# Patient Record
Sex: MALE | Race: WHITE | NOT HISPANIC OR LATINO | Employment: UNEMPLOYED | URBAN - METROPOLITAN AREA
[De-identification: names, ages, dates, MRNs, and addresses within clinical notes are randomized per-mention and may not be internally consistent; named-entity substitution may affect disease eponyms.]

---

## 2021-04-01 ENCOUNTER — HOSPITAL ENCOUNTER (EMERGENCY)
Facility: CLINIC | Age: 36
Discharge: HOME | End: 2021-04-02
Attending: EMERGENCY MEDICINE | Admitting: EMERGENCY MEDICINE
Payer: COMMERCIAL

## 2021-04-01 DIAGNOSIS — F10.920 ALCOHOLIC INTOXICATION WITHOUT COMPLICATION (H): ICD-10-CM

## 2021-04-01 LAB — ALCOHOL BREATH TEST: 0.22 (ref 0–0.01)

## 2021-04-01 PROCEDURE — 82075 ASSAY OF BREATH ETHANOL: CPT | Performed by: EMERGENCY MEDICINE

## 2021-04-01 PROCEDURE — 99283 EMERGENCY DEPT VISIT LOW MDM: CPT | Performed by: EMERGENCY MEDICINE

## 2021-04-01 RX ORDER — ONDANSETRON 4 MG/1
4 TABLET, ORALLY DISINTEGRATING ORAL EVERY 6 HOURS PRN
Status: DISCONTINUED | OUTPATIENT
Start: 2021-04-01 | End: 2021-04-02 | Stop reason: HOSPADM

## 2021-04-01 RX ORDER — PANTOPRAZOLE SODIUM 40 MG/1
40 TABLET, DELAYED RELEASE ORAL
Status: DISCONTINUED | OUTPATIENT
Start: 2021-04-02 | End: 2021-04-02 | Stop reason: HOSPADM

## 2021-04-01 ASSESSMENT — ENCOUNTER SYMPTOMS
VOMITING: 1
ANAL BLEEDING: 0
ABDOMINAL PAIN: 0
BLOOD IN STOOL: 0

## 2021-04-02 VITALS
BODY MASS INDEX: 27.98 KG/M2 | RESPIRATION RATE: 16 BRPM | OXYGEN SATURATION: 97 % | HEART RATE: 97 BPM | WEIGHT: 195 LBS | SYSTOLIC BLOOD PRESSURE: 126 MMHG | TEMPERATURE: 98.1 F | DIASTOLIC BLOOD PRESSURE: 90 MMHG

## 2021-04-02 PROCEDURE — 250N000011 HC RX IP 250 OP 636: Performed by: EMERGENCY MEDICINE

## 2021-04-02 RX ADMIN — ONDANSETRON 4 MG: 4 TABLET, ORALLY DISINTEGRATING ORAL at 04:09

## 2021-04-02 NOTE — ED NOTES
Emergency Department Patient Sign-out       Brief HPI:  This is a 36 year old male signed out to me by Dr. Tamayo .  See initial ED Provider note for details of the presentation.            Significant Events prior to my assuming care: Patient recently broke sobriety after drinking 2 pints of whiskey for the past day.  He lives in a sober house.  No psychiatric complaints.  Awaiting sobriety, discharged when sober.      Exam:   Patient Vitals for the past 24 hrs:   BP Temp Temp src Pulse Resp SpO2 Weight   04/01/21 2340 (!) 142/90 95.4  F (35.2  C) Oral 91 18 98 % 88.5 kg (195 lb)           ED RESULTS:   Results for orders placed or performed during the hospital encounter of 04/01/21 (from the past 24 hour(s))   Alcohol breath test POCT     Status: Abnormal    Collection Time: 04/01/21 11:50 PM   Result Value Ref Range    Alcohol Breath Test 0.223 (A) 0.00 - 0.01       ED MEDICATIONS:   Medications   ondansetron (ZOFRAN-ODT) ODT tab 4 mg (has no administration in time range)   pantoprazole (PROTONIX) EC tablet 40 mg (has no administration in time range)         Impression:    ICD-10-CM    1. Alcoholic intoxication without complication (H)  F10.920        Plan:    Patient is clinically sober.  He is not exhibiting signs or symptoms of withdrawal.  The patient be discharged back to his sober house.        MD Adiel Dejesus Matthew Joseph, MD  04/02/21 6711

## 2021-04-02 NOTE — ED PROVIDER NOTES
[facility] EMERGENCY DEPARTMENT ([facility])     April 1, 2021  History     Chief Complaint   Patient presents with     Alcohol Intoxication     Sober for 2 months and then drank 2 pints today and is seeking detox. Last drink 4pm. Not suicidal or hoimicidal.      HPI  Alberto Avelar is a 36 year old male with a PMH of alcohol abuse, GERD, perforated esophagus, peptic ulcer, HTN, anxiety and MDD who presents to the ED today seeking detox. Patient states that he lost his job earlier today.  Patient states that he fell off the wagon and started drinking 2 pints and lives in a sober house.  The sober house told him he needed to come to the ER for evaluation.  Patient states he has been sober for the last 2 months and states that he has had some vomiting but has some chronic GERD type symptoms for which he takes Nexium.  The patient denies any melena or bright blood per rectum, denies any abdominal pain, denies any previous history of pancreatitis or hepatitis, and denies any suicidal ideation.  Patient states he simply fell off the wagon and he tried to tell his sober house that he would be okay but they told him to come here instead.    Patient was recently seen at [facility] urgent care on 2/11/2021 complaining of cellulitis of LUE.  He had reportedly gotten a tattoo 2 weeks prior on his left arm.  He was provided with a prescription for doxycycline and instructed to take ibuprofen for 100 mg every 6 hours as needed for pain.    I have reviewed the Medications, Allergies, Past Medical and Surgical History, and Social History in the [facility] system.    PAST MEDICAL HISTORY:   Past Medical History:   Diagnosis Date     Mild intermittent asthma 11/18/2007    Worse as a child.   Flares with infections       PAST SURGICAL HISTORY: History reviewed. No pertinent surgical history.    Past medical history, past surgical history, medications, and allergies were reviewed with the patient. Additional pertinent items:  None    FAMILY HISTORY:   Family History   Problem Relation Age of Onset     Cancer - colorectal No family hx of      Prostate Cancer No family hx of        SOCIAL HISTORY:   Social History     Tobacco Use     Smoking status: Former Smoker     Packs/day: 1.00     Types: Cigarettes     Quit date: 10/3/2012     Years since quittin.5     Smokeless tobacco: Never Used     Tobacco comment: occasional- quiting   Substance Use Topics     Alcohol use: Yes     Comment: quit date 12/ relapse 60 days ago.      Social history was reviewed with the patient. Additional pertinent items: None      Patient's Medications    BISACODYL (DULCOLAX) 5 MG EC TABLET    Take all four tabs with 8 oz of fluid as instructed by colonoscopy prep instructions    ESOMEPRAZOLE (NEXIUM) 40 MG CAPSULE    Take 1 capsule by mouth every morning (before breakfast). Take 30-60 minutes before a eating.    POLYETHYLENE GLYCOL (MIRALAX) POWDER    Mix and use as directed for colonoscopy prep    TRIAMCINOLONE (KENALOG) 0.1 % CREAM    Apply twice daily along with OTC Lotrimin cream to affected area for 14 days.          Allergies   Allergen Reactions     Amoxicillin      rash     Mold      Probable mold allergy        Review of Systems   Constitutional:        (ETOH intoxication seeking detox)     Gastrointestinal: Positive for vomiting. Negative for abdominal pain, anal bleeding and blood in stool.   Psychiatric/Behavioral: Negative for suicidal ideas.   All other systems reviewed and are negative.    A complete review of systems was performed with pertinent positives and negatives noted in the HPI, and all other systems negative.    Physical Exam   BP: (!) 142/90  Pulse: 91  Temp: 95.4  F (35.2  C)  Resp: 18  Weight: 88.5 kg (195 lb)  SpO2: 98 %      Physical Exam  Vitals signs and nursing note reviewed.   Constitutional:       Comments: Grossly intoxicated  Cooperative   HENT:      Head: Atraumatic.   Eyes:      Extraocular Movements: Extraocular  movements intact.      Pupils: Pupils are equal, round, and reactive to light.   Neck:      Musculoskeletal: Neck supple.   Pulmonary:      Breath sounds: Normal breath sounds.   Abdominal:      Palpations: Abdomen is soft.      Tenderness: There is no abdominal tenderness.   Musculoskeletal:         General: No deformity.   Neurological:      General: No focal deficit present.      Mental Status: He is alert and oriented to person, place, and time.   Psychiatric:      Comments: Calm but intoxicated         ED Course   11:50 PM  The patient was seen and examined by Cornel Tamayo MD in Room ED07.        Procedures            Results for orders placed or performed during the hospital encounter of 04/01/21 (from the past 24 hour(s))   Alcohol breath test POCT   Result Value Ref Range    Alcohol Breath Test 0.223 (A) 0.00 - 0.01     Medications   ondansetron (ZOFRAN-ODT) ODT tab 4 mg (has no administration in time range)   esomeprazole (nexIUM) CR capsule 40 mg (has no administration in time range)             Assessments & Plan (with Medical Decision Making)     I have reviewed the nursing notes.    At this time there are no detox beds available in our hospital and this was communicated to the patient.  Patient more than likely will not need detox because he has only started drinking for 24 hours.  Patient will be kept in the ER to sober up and then will more than likely be sent back to his sober house.    Case will be signed out to my partners to follow-up with the final disposition for this patient.    I have reviewed the findings, diagnosis, and plan with the patient.      Working diagnoses:   Alcoholic intoxication without complication (H)     Cornel Tamayo MD, Chente DESAI, am serving as a trained medical scribe to document services personally performed by Cornel Tamayo Md, MD, based on the provider's statements to me.     I, Cornel Tamayo Md, MD, was physically present  and have reviewed and verified the accuracy of this note documented by Chente Santacruz.      4/1/2021   [facility] EMERGENCY DEPARTMENT     Cornel Tamayo MD  04/02/21 0007

## 2021-04-02 NOTE — ED TRIAGE NOTES
Sober for 2 months and then drank 2 pints today and is seeking detox. Last drink 4pm. Not suicidal or hoimicidal.

## 2022-12-27 ENCOUNTER — HOSPITAL ENCOUNTER (EMERGENCY)
Facility: CLINIC | Age: 37
Discharge: HOME | End: 2022-12-27
Attending: EMERGENCY MEDICINE | Admitting: EMERGENCY MEDICINE
Payer: COMMERCIAL

## 2022-12-27 VITALS
SYSTOLIC BLOOD PRESSURE: 158 MMHG | RESPIRATION RATE: 16 BRPM | TEMPERATURE: 98.8 F | HEART RATE: 66 BPM | OXYGEN SATURATION: 99 % | DIASTOLIC BLOOD PRESSURE: 107 MMHG

## 2022-12-27 DIAGNOSIS — R05.2 SUBACUTE COUGH: ICD-10-CM

## 2022-12-27 LAB
FLUAV RNA SPEC QL NAA+PROBE: NEGATIVE
FLUBV RNA RESP QL NAA+PROBE: NEGATIVE
RSV RNA SPEC NAA+PROBE: NEGATIVE
SARS-COV-2 RNA RESP QL NAA+PROBE: NEGATIVE

## 2022-12-27 PROCEDURE — 99283 EMERGENCY DEPT VISIT LOW MDM: CPT | Mod: CS | Performed by: EMERGENCY MEDICINE

## 2022-12-27 PROCEDURE — C9803 HOPD COVID-19 SPEC COLLECT: HCPCS | Performed by: EMERGENCY MEDICINE

## 2022-12-27 PROCEDURE — 87637 SARSCOV2&INF A&B&RSV AMP PRB: CPT | Performed by: EMERGENCY MEDICINE

## 2022-12-27 RX ORDER — GUAIFENESIN 200 MG/10ML
10 LIQUID ORAL EVERY 4 HOURS PRN
Qty: 1000 ML | Refills: 0 | Status: SHIPPED | OUTPATIENT
Start: 2022-12-27 | End: 2023-01-17

## 2022-12-27 ASSESSMENT — ENCOUNTER SYMPTOMS
NAUSEA: 0
CONFUSION: 0
DIARRHEA: 0
WEAKNESS: 0
ABDOMINAL PAIN: 0
FEVER: 0
EYE REDNESS: 0
COUGH: 1
BACK PAIN: 0
VOMITING: 0
DIFFICULTY URINATING: 0
SHORTNESS OF BREATH: 0
CONSTIPATION: 0

## 2022-12-27 ASSESSMENT — ACTIVITIES OF DAILY LIVING (ADL): ADLS_ACUITY_SCORE: 33

## 2022-12-28 NOTE — ED PROVIDER NOTES
ED Provider Note  [facility]      History     Chief Complaint   Patient presents with     Cough     HPI  Alberto Avelar is a 37 year old male who presents to the emergency department with concern about chronic cough that is not going away, has been ongoing for approximately 3 months.  States that he has undergone multiple rounds of evaluation as an outpatient but has not been able to determine a cause.  Patient reports that he does have a history of reflux, takes omeprazole.  Also is a chronic smoker.  States he has previously needed an inhaler, has run out of his medication.  Is not taking anything currently for the cough including over-the-counter cough medication.  He resides at a residential treatment facility and repeatedly states that other people around him are upset at him for the cough.  He is requesting evaluation for this as well as treatment.    Past Medical History  Past Medical History:   Diagnosis Date     Mild intermittent asthma 11/18/2007    Worse as a child.   Flares with infections     No past surgical history on file.  guaiFENesin (ROBITUSSIN) 20 mg/mL liquid  bisacodyl (DULCOLAX) 5 MG EC tablet  esomeprazole (NEXIUM) 40 MG capsule  polyethylene glycol (MIRALAX) powder  triamcinolone (KENALOG) 0.1 % cream      Allergies   Allergen Reactions     Amoxicillin      rash     Mold      Probable mold allergy     Family History  Family History   Problem Relation Age of Onset     Cancer - colorectal No family hx of      Prostate Cancer No family hx of      Social History   Social History     Tobacco Use     Smoking status: Former     Packs/day: 1.00     Types: Cigarettes     Quit date: 10/3/2012     Years since quitting: 10.2     Smokeless tobacco: Never     Tobacco comments:     occasional- quiting   Substance Use Topics     Alcohol use: Yes     Comment: quit date 2-16-12/ relapse 60 days ago.      Drug use: No      Past medical history, past surgical history, medications,  allergies, family history, and social history were reviewed with the patient. No additional pertinent items.       Review of Systems   Constitutional: Negative for fever.   HENT: Negative for congestion.    Eyes: Negative for redness.   Respiratory: Positive for cough. Negative for shortness of breath.    Cardiovascular: Negative for chest pain.   Gastrointestinal: Negative for abdominal pain, constipation, diarrhea, nausea and vomiting.   Genitourinary: Negative for difficulty urinating.   Musculoskeletal: Negative for back pain.   Skin: Negative for rash.   Neurological: Negative for weakness.   Psychiatric/Behavioral: Negative for confusion.     A complete review of systems was performed with pertinent positives and negatives noted in the HPI, and all other systems negative.    Physical Exam   BP: (!) 158/107  Pulse: 66  Temp: 98.8  F (37.1  C)  Resp: 16  SpO2: 99 %  Physical Exam  Constitutional:       General: He is awake. He is not in acute distress.     Appearance: Normal appearance. He is well-developed. He is not ill-appearing or toxic-appearing.   HENT:      Head: Atraumatic.   Eyes:      General: No scleral icterus.     Pupils: Pupils are equal, round, and reactive to light.   Cardiovascular:      Rate and Rhythm: Normal rate and regular rhythm.      Heart sounds: Normal heart sounds, S1 normal and S2 normal.   Pulmonary:      Effort: No respiratory distress.      Breath sounds: Normal breath sounds.   Abdominal:      General: Bowel sounds are normal.      Palpations: Abdomen is soft.      Tenderness: There is no abdominal tenderness.   Musculoskeletal:         General: No tenderness.   Skin:     General: Skin is warm.      Findings: No rash.   Neurological:      Mental Status: He is alert and oriented to person, place, and time.   Psychiatric:         Mood and Affect: Mood normal.         Speech: Speech normal.         Behavior: Behavior is cooperative.         ED Course      Procedures                      Results for orders placed or performed during the hospital encounter of 12/27/22   Symptomatic Influenza A/B & SARS-CoV2 (COVID-19) Virus PCR Multiplex Nasopharyngeal     Status: Normal    Specimen: Nasopharyngeal; Swab   Result Value Ref Range    Influenza A PCR Negative Negative    Influenza B PCR Negative Negative    RSV PCR Negative Negative    SARS CoV2 PCR Negative Negative    Narrative    Testing was performed using the Xpert Xpress CoV2/Flu/RSV Assay on the [employer] GeneXpert Instrument. This test should be ordered for the detection of SARS-CoV-2 and influenza viruses in individuals who meet clinical and/or epidemiological criteria. Test performance is unknown in asymptomatic patients. This test is for in vitro diagnostic use under the FDA EUA for laboratories certified under CLIA to perform high or moderate complexity testing. This test has not been FDA cleared or approved. A negative result does not rule out the presence of PCR inhibitors in the specimen or target RNA in concentration below the limit of detection for the assay. If only one viral target is positive but coinfection with multiple targets is suspected, the sample should be re-tested with another FDA cleared, approved, or authorized test, if coinfection would change clinical management. This test was validated by the [facility]. These laboratories are certified under the Clinical Laboratory Improvement Amendments of 1988 (CLIA-88) as qualified to perform high complexity laboratory testing.     Medications - No data to display     Assessments & Plan (with Medical Decision Making)   Alberto Avelar is a 37 year old male who presents to the emergency department with concern about chronic cough that is not going away, has been ongoing for approximately 3 months.  Extremely low suspicion for acute pathology, however viral bronchitis could certainly be possible.  His symptoms are not consistent with a bacterial pneumonia given  the length of time he has been having a cough.  It could also be related to his GERD or chronic bronchitis from continuing to smoke.  At this point, no indication for emergent imaging, will send viral swab.    I have reviewed the nursing notes. I have reviewed the findings, diagnosis, plan and need for follow up with the patient.    Swab negative for the tested viruses.  We will discharge him with prescription for guaifenesin and recommendation he follow-up as an outpatient, go to ED precautions provided.    Discharge Medication List as of 12/27/2022 11:06 PM      START taking these medications    Details   guaiFENesin (ROBITUSSIN) 20 mg/mL liquid Take 10 mLs by mouth every 4 hours as needed for cough, Disp-1000 mL, R-0, Local Print             Final diagnoses:   Subacute cough       --  Nino Kruse MD PhD  [facility] EMERGENCY DEPARTMENT  12/27/2022     Christo Kruse MD  12/27/22 [id number]

## 2022-12-28 NOTE — ED TRIAGE NOTES
"38 y/o male comes in today for a cough that has been going on for a \"solid\" 3 months. Patient reports smoking medical cannibas. The patient has not been observed coughing during this triage assessment. Patient currently lives in a sober house. He does have rappetive and slurred speech. Covid-19 swab completed in triage.      BP (!) 158/107   Pulse 66   Temp 98.8  F (37.1  C) (Oral)   Resp 16   SpO2 99%     Monie aJy on 12/27/2022 at 9:25 PM      "